# Patient Record
Sex: FEMALE | Race: WHITE | NOT HISPANIC OR LATINO | Employment: OTHER | URBAN - METROPOLITAN AREA
[De-identification: names, ages, dates, MRNs, and addresses within clinical notes are randomized per-mention and may not be internally consistent; named-entity substitution may affect disease eponyms.]

---

## 2017-01-03 ENCOUNTER — TELEPHONE (OUTPATIENT)
Dept: SMOKING CESSATION | Facility: CLINIC | Age: 67
End: 2017-01-03

## 2017-01-04 ENCOUNTER — CLINICAL SUPPORT (OUTPATIENT)
Dept: SMOKING CESSATION | Facility: CLINIC | Age: 67
End: 2017-01-04
Payer: COMMERCIAL

## 2017-01-04 DIAGNOSIS — F17.200 TOBACCO USE DISORDER: Primary | ICD-10-CM

## 2017-01-04 PROCEDURE — 99999 PR PBB SHADOW E&M-EST. PATIENT-LVL II: CPT | Mod: PBBFAC,,,

## 2017-01-04 PROCEDURE — 99407 BEHAV CHNG SMOKING > 10 MIN: CPT | Mod: S$GLB,,, | Performed by: GENERAL PRACTICE

## 2017-01-04 RX ORDER — IBUPROFEN 200 MG
1 TABLET ORAL DAILY
Qty: 14 PATCH | Refills: 0 | Status: SHIPPED | OUTPATIENT
Start: 2017-01-04 | End: 2017-01-11 | Stop reason: SDUPTHER

## 2017-01-11 ENCOUNTER — TELEPHONE (OUTPATIENT)
Dept: SMOKING CESSATION | Facility: CLINIC | Age: 67
End: 2017-01-11

## 2017-01-11 DIAGNOSIS — F17.200 TOBACCO USE DISORDER: ICD-10-CM

## 2017-01-11 RX ORDER — IBUPROFEN 200 MG
1 TABLET ORAL DAILY
Qty: 14 PATCH | Refills: 0 | Status: SHIPPED | OUTPATIENT
Start: 2017-01-11 | End: 2018-11-13

## 2017-01-11 NOTE — TELEPHONE ENCOUNTER
I called patient for follow up and she is currently smoking 3-5 cigarettes per day and using a 14 mg patch with no negative side effects at this time. Patient is taking Wellbutrin prescribed by her PCP since last week with no negative side effects noted. She needs refill on the 14 mg patch at this time.

## 2017-01-19 ENCOUNTER — CLINICAL SUPPORT (OUTPATIENT)
Dept: SMOKING CESSATION | Facility: CLINIC | Age: 67
End: 2017-01-19
Payer: COMMERCIAL

## 2017-01-19 DIAGNOSIS — F17.200 TOBACCO USE DISORDER: Primary | ICD-10-CM

## 2017-01-19 PROCEDURE — 99407 BEHAV CHNG SMOKING > 10 MIN: CPT | Mod: S$GLB,,, | Performed by: GENERAL PRACTICE

## 2017-01-19 PROCEDURE — 99999 PR PBB SHADOW E&M-EST. PATIENT-LVL II: CPT | Mod: PBBFAC,,,

## 2017-01-31 ENCOUNTER — TELEPHONE (OUTPATIENT)
Dept: SMOKING CESSATION | Facility: CLINIC | Age: 67
End: 2017-01-31

## 2017-05-16 ENCOUNTER — CLINICAL SUPPORT (OUTPATIENT)
Dept: SMOKING CESSATION | Facility: CLINIC | Age: 67
End: 2017-05-16
Payer: COMMERCIAL

## 2017-05-16 DIAGNOSIS — F17.200 NICOTINE DEPENDENCE: Primary | ICD-10-CM

## 2017-05-16 PROCEDURE — 99406 BEHAV CHNG SMOKING 3-10 MIN: CPT | Mod: S$GLB,,,

## 2018-01-03 ENCOUNTER — CLINICAL SUPPORT (OUTPATIENT)
Dept: SMOKING CESSATION | Facility: CLINIC | Age: 68
End: 2018-01-03
Payer: COMMERCIAL

## 2018-01-03 DIAGNOSIS — F17.200 NICOTINE DEPENDENCE: Primary | ICD-10-CM

## 2018-01-03 PROCEDURE — 99406 BEHAV CHNG SMOKING 3-10 MIN: CPT | Mod: S$GLB,,,

## 2018-10-30 ENCOUNTER — TELEPHONE (OUTPATIENT)
Dept: VASCULAR SURGERY | Facility: CLINIC | Age: 68
End: 2018-10-30

## 2018-10-30 DIAGNOSIS — E32.9: ICD-10-CM

## 2018-10-30 DIAGNOSIS — E32.8: Primary | ICD-10-CM

## 2018-10-30 DIAGNOSIS — I10 ESSENTIAL HYPERTENSION: Primary | ICD-10-CM

## 2018-10-30 NOTE — TELEPHONE ENCOUNTER
----- Message from Camila Castellano sent at 10/30/2018 11:28 AM CDT -----  Contact: pt [phone]  Patient called and asked if you will order a Cat Scan of her Chest Wall patient states you want her to have this done every other  Year.

## 2018-11-02 ENCOUNTER — HOSPITAL ENCOUNTER (OUTPATIENT)
Dept: RADIOLOGY | Facility: HOSPITAL | Age: 68
Discharge: HOME | End: 2018-11-02
Attending: THORACIC SURGERY (CARDIOTHORACIC VASCULAR SURGERY)
Payer: MEDICARE

## 2018-11-02 DIAGNOSIS — E32.8: ICD-10-CM

## 2018-11-02 DIAGNOSIS — E32.9: ICD-10-CM

## 2018-11-02 PROCEDURE — 71250 CT THORAX DX C-: CPT | Mod: 26,,, | Performed by: RADIOLOGY

## 2018-11-02 PROCEDURE — 71250 CT THORAX DX C-: CPT | Mod: TC,PO

## 2018-11-13 ENCOUNTER — OFFICE VISIT (OUTPATIENT)
Dept: VASCULAR SURGERY | Facility: CLINIC | Age: 68
End: 2018-11-13
Payer: MEDICARE

## 2018-11-13 VITALS
WEIGHT: 198.81 LBS | HEIGHT: 62 IN | HEART RATE: 78 BPM | BODY MASS INDEX: 36.59 KG/M2 | DIASTOLIC BLOOD PRESSURE: 65 MMHG | SYSTOLIC BLOOD PRESSURE: 149 MMHG

## 2018-11-13 DIAGNOSIS — E32.8 THYMIC CYST: Primary | ICD-10-CM

## 2018-11-13 PROCEDURE — 99999 PR PBB SHADOW E&M-EST. PATIENT-LVL III: CPT | Mod: PBBFAC,,, | Performed by: THORACIC SURGERY (CARDIOTHORACIC VASCULAR SURGERY)

## 2018-11-13 PROCEDURE — 99213 OFFICE O/P EST LOW 20 MIN: CPT | Mod: PBBFAC,PO | Performed by: THORACIC SURGERY (CARDIOTHORACIC VASCULAR SURGERY)

## 2018-11-13 PROCEDURE — 99213 OFFICE O/P EST LOW 20 MIN: CPT | Mod: S$PBB,,, | Performed by: THORACIC SURGERY (CARDIOTHORACIC VASCULAR SURGERY)

## 2018-11-13 RX ORDER — LOSARTAN POTASSIUM 100 MG/1
100 TABLET ORAL DAILY
Refills: 1 | COMMUNITY
Start: 2018-11-06 | End: 2022-01-24 | Stop reason: CLARIF

## 2018-11-13 RX ORDER — SPIRONOLACTONE 25 MG/1
25 TABLET ORAL EVERY MORNING
Refills: 1 | COMMUNITY
Start: 2018-11-06

## 2018-11-13 RX ORDER — OMEPRAZOLE 40 MG/1
40 CAPSULE, DELAYED RELEASE ORAL 2 TIMES DAILY
COMMUNITY
Start: 2018-11-10

## 2018-11-13 NOTE — PROGRESS NOTES
CLINIC FOLLOW-UP NOTE:    DATE OF ENCOUNTER:  11/13/2018    HISTORY OF PRESENT ILLNESS:  Ms. 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  This was discovered at   the time of the gallbladder illness in 2014.  It was unchanged during evaluation in 2016.  She has had no fevers, chills or   night sweats. She has no myasthenic symptoms.     MEDICATIONS AND ALLERGIES:  Reviewed.     REVIEW OF SYSTEMS:    GENERAL:  No myasthenia symptoms.  CHEST:  No chest pains.  RESPIRATORY:  No shortness of breath.  CARDIAC:  No angina.  EXTREMITIES:  Bilateral lower extremity claudication present.     PHYSICAL EXAMINATION:  VITAL SIGNS:  Blood pressure 149/65, heart rate 78, weight 90.2 kg  GENERAL:  She appears well.  HEENT:  Normocephalic, atraumatic.  There is no ptosis.  NECK:  Trachea is midline.  There are no palpable masses.  Carotids are 2+.    There are no carotid bruits.  CHEST:  No chest wall abnormalities.  LUNGS:  Clear bilaterally.  HEART:  Regular rate and rhythm.  No rubs or murmurs.  NEUROLOGIC:  Alert and oriented x4.     STUDIES:   CT scan of the chest from [facility] on 12/08/2016, which I have   reviewed personally, reveals a 3 x 2.4 cm cystic abnormality in the area of the   thymus.  This is completely without change from previous studies dating back to   2014.       CT Chest Without Contrast 11/02/2018  Status: Final result   [employer] Results Release     Emulation [employer]ification [employer] Status: Declined    PACS Images for [employer] Viewer      Show images for CT Chest Without Contrast   PACS Images for [employer] [place] Viewer (Includes Bauman Images)      Show images for CT Chest Without Contrast   Reviewed By Nisa Escobar MD on 11/2/2018 16:02   CT Chest Without Contrast   Order: [id number]   Status:  Final result   Visible to patient:  No (Not Released)   Next appt:  None   Dx:  Cyst of thymus gland; Disease of thymus   Details     Reading Physician Reading  Date Result Priority   Jv Napier MD 11/2/2018       Narrative     EXAMINATION:  CT CHEST WITHOUT CONTRAST    CLINICAL HISTORY:  Diseases of thymus; Other diseases of thymus    TECHNIQUE:  Low dose axial images, sagittal and coronal reformations were obtained from the thoracic inlet to the lung bases. Contrast was not administered.    COMPARISON:  11/27/2015    FINDINGS:  There is a thinly walled well-circumscribed oval-shaped cystic mass within the anterior superior mediastinum with a thin flake of mural calcification inferiorly most consistent with a thymic cyst.  The cyst is unchanged in size and appearance and measures 2.9 x 2.8 x 2.4 cm.  No mediastinal or hilar adenopathy are present.  A 2 mm calcified granuloma is present in the lateral aspect of the left lower lobe best seen on image 153.  No pleural or pericardial effusion are present.  The thoracic inlet and axillary regions are unremarkable.  The upper abdomen is unremarkable.  The gallbladder is surgically absent.      Impression       1. Benign-appearing thin walled cystic mass in the anterior superior mediastinum which is unchanged from the previous study and is most consistent with a finding cyst.  2. Benign 2 mm calcified granuloma in the left lower lobe.      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    RECOMMENDATIONS:  No further follow-up of her benign thymic cyst is necessary.

## 2020-02-20 ENCOUNTER — TELEPHONE (OUTPATIENT)
Dept: ENDOSCOPY | Facility: HOSPITAL | Age: 70
End: 2020-02-20

## 2020-02-20 NOTE — TELEPHONE ENCOUNTER
Referral received from [facility] for patient to have a ph probe on PPI meds done here at [facility].    Called patient and she informed me that she wanted it done at [facility] location. I informed her I was pretty sure [facility] was the only location that performed this procedure. She said she would call [facility] herself and check.  If she wanted to have it completed and it needed to be here then she needs to wait bc she has no one to come with her. She will reach back out to me if she wants to have it and complete at [facility]. Has my direct line to call back if so.

## 2023-05-16 ENCOUNTER — TELEPHONE (OUTPATIENT)
Dept: ENDOSCOPY | Facility: HOSPITAL | Age: 73
End: 2023-05-16
Payer: MEDICARE

## 2023-05-16 NOTE — TELEPHONE ENCOUNTER
Contacted patient to schedule Esophageal Manometry with 24 hour pH impedance. LVM with direct line.

## 2023-05-19 ENCOUNTER — TELEPHONE (OUTPATIENT)
Dept: ENDOSCOPY | Facility: HOSPITAL | Age: 73
End: 2023-05-19
Payer: MEDICARE

## 2023-05-19 NOTE — TELEPHONE ENCOUNTER
Ma called pt to schedule procedure   Phone went straight to vm x2  Message left on pt vm with call back number

## 2023-05-24 ENCOUNTER — TELEPHONE (OUTPATIENT)
Dept: ENDOSCOPY | Facility: HOSPITAL | Age: 73
End: 2023-05-24
Payer: MEDICARE

## 2023-05-24 NOTE — TELEPHONE ENCOUNTER
"----- Message from Marita Munoz RN sent at 4/20/2023 11:19 AM CDT -----  Regarding: FW: Gastro Referral    ----- Message -----  From: Rachel Monge MA  Sent: 4/20/2023  10:57 AM CDT  To: Marita Munoz RN  Subject: FW: Gastro Referral                                ----- Message -----  From: Cheryl Wiggins  Sent: 4/20/2023  10:01 AM CDT  To: Rachel Monge MA  Subject: Gastro Referral                                  Good morning,  Patient was referred to Gastro department for "PH Impendence Study".     I have scanned the referral and records into  for your review.    Please advise if this is something you/ your department would schedule. I have never seen this before and I was unsure how to proceed.    Thank you,  Cheryl"

## 2023-05-24 NOTE — TELEPHONE ENCOUNTER
Pt states to please stop calling her regarding a procedure. Pt states she does not want a procedure done. Pt disconnected call shortly after.

## 2023-05-24 NOTE — TELEPHONE ENCOUNTER
----- Message from Charly Gutiérrez MA sent at 5/24/2023  3:38 PM CDT -----  Pt states to please stop calling her regarding a procedure. Pt states she does not want a procedure done. Pt disconnected call shortly after.